# Patient Record
Sex: MALE | Race: WHITE | Employment: PART TIME | URBAN - METROPOLITAN AREA
[De-identification: names, ages, dates, MRNs, and addresses within clinical notes are randomized per-mention and may not be internally consistent; named-entity substitution may affect disease eponyms.]

---

## 2017-06-28 PROCEDURE — 86141 C-REACTIVE PROTEIN HS: CPT | Performed by: INTERNAL MEDICINE

## 2017-11-22 PROCEDURE — 87081 CULTURE SCREEN ONLY: CPT | Performed by: EMERGENCY MEDICINE

## 2018-04-19 PROCEDURE — 87205 SMEAR GRAM STAIN: CPT | Performed by: INTERNAL MEDICINE

## 2018-04-19 PROCEDURE — 87070 CULTURE OTHR SPECIMN AEROBIC: CPT | Performed by: INTERNAL MEDICINE

## 2018-04-19 PROCEDURE — 87186 SC STD MICRODIL/AGAR DIL: CPT | Performed by: INTERNAL MEDICINE

## 2018-04-19 PROCEDURE — 87077 CULTURE AEROBIC IDENTIFY: CPT | Performed by: INTERNAL MEDICINE

## 2020-05-14 ENCOUNTER — HOSPITAL ENCOUNTER (EMERGENCY)
Age: 50
Discharge: HOME | End: 2020-05-14
Attending: EMERGENCY MEDICINE
Payer: OTHER MISCELLANEOUS

## 2020-05-14 VITALS
HEART RATE: 74 BPM | BODY MASS INDEX: 45.1 KG/M2 | TEMPERATURE: 98 F | HEIGHT: 70 IN | RESPIRATION RATE: 16 BRPM | WEIGHT: 315 LBS | DIASTOLIC BLOOD PRESSURE: 74 MMHG | SYSTOLIC BLOOD PRESSURE: 138 MMHG | OXYGEN SATURATION: 98 %

## 2020-05-14 DIAGNOSIS — M79.5 FOREIGN BODY (FB) IN SOFT TISSUE: Primary | ICD-10-CM

## 2020-05-14 PROCEDURE — 90471 IMMUNIZATION ADMIN: CPT

## 2020-05-14 PROCEDURE — 10120 INC&RMVL FB SUBQ TISS SMPL: CPT

## 2020-05-14 PROCEDURE — 99284 EMERGENCY DEPT VISIT MOD MDM: CPT

## 2020-05-14 RX ORDER — CEPHALEXIN 250 MG/1
250 CAPSULE ORAL ONCE
Status: COMPLETED | OUTPATIENT
Start: 2020-05-14 | End: 2020-05-14

## 2020-05-14 RX ORDER — LIDOCAINE HYDROCHLORIDE AND EPINEPHRINE 10; 10 MG/ML; UG/ML
1 INJECTION, SOLUTION INFILTRATION; PERINEURAL ONCE
Status: DISCONTINUED | OUTPATIENT
Start: 2020-05-14 | End: 2020-05-14

## 2020-05-14 RX ORDER — LIDOCAINE HYDROCHLORIDE AND EPINEPHRINE 15; 5 MG/ML; UG/ML
1 INJECTION, SOLUTION EPIDURAL ONCE
Status: COMPLETED | OUTPATIENT
Start: 2020-05-14 | End: 2020-05-14

## 2020-05-14 RX ORDER — NAPROXEN 500 MG/1
500 TABLET ORAL 2 TIMES DAILY PRN
Qty: 20 TABLET | Refills: 0 | Status: SHIPPED | OUTPATIENT
Start: 2020-05-14 | End: 2020-05-21

## 2020-05-14 RX ORDER — CEPHALEXIN 500 MG/1
500 CAPSULE ORAL 4 TIMES DAILY
Qty: 40 CAPSULE | Refills: 0 | Status: SHIPPED | OUTPATIENT
Start: 2020-05-14 | End: 2020-05-24

## 2020-05-15 ENCOUNTER — APPOINTMENT (OUTPATIENT)
Dept: OTHER | Facility: HOSPITAL | Age: 50
End: 2020-05-15
Attending: FAMILY MEDICINE

## 2020-05-15 NOTE — ED PROVIDER NOTES
Patient Seen in: [facility] In [place]      History   Patient presents with:  FB in Skin    Stated Complaint: sliver in arm     HPI    44 old male who is right-hand dominant presents to the emerge department after he sustained a \"sli Appearance: Normal appearance. He is normal weight. HENT:      Head: Normocephalic and atraumatic. Cardiovascular:      Rate and Rhythm: Normal rate and regular rhythm. Pulses: Normal pulses. Heart sounds: Normal heart sounds.    Pulmonary: Schedule an appointment as soon as possible for a visit  As needed    [facility] in [facility] [address]  [phone]    As needed, For wound re-check    Johanne Alarcon [phone]

## 2021-04-03 ENCOUNTER — LAB ENCOUNTER (OUTPATIENT)
Dept: LAB | Facility: HOSPITAL | Age: 51
End: 2021-04-03
Attending: INTERNAL MEDICINE
Payer: COMMERCIAL

## 2021-04-03 DIAGNOSIS — Z12.11 SPECIAL SCREENING FOR MALIGNANT NEOPLASMS, COLON: ICD-10-CM

## 2021-04-05 ENCOUNTER — HOSPITAL ENCOUNTER (OUTPATIENT)
Facility: HOSPITAL | Age: 51
Setting detail: HOSPITAL OUTPATIENT SURGERY
Discharge: HOME | End: 2021-04-05
Attending: INTERNAL MEDICINE | Admitting: INTERNAL MEDICINE
Payer: COMMERCIAL

## 2021-04-05 ENCOUNTER — ANESTHESIA (OUTPATIENT)
Dept: ENDOSCOPY | Facility: HOSPITAL | Age: 51
End: 2021-04-05
Payer: COMMERCIAL

## 2021-04-05 ENCOUNTER — ANESTHESIA EVENT (OUTPATIENT)
Dept: ENDOSCOPY | Facility: HOSPITAL | Age: 51
End: 2021-04-05
Payer: COMMERCIAL

## 2021-04-05 VITALS
RESPIRATION RATE: 18 BRPM | TEMPERATURE: 98 F | DIASTOLIC BLOOD PRESSURE: 79 MMHG | HEART RATE: 73 BPM | WEIGHT: 315 LBS | OXYGEN SATURATION: 99 % | BODY MASS INDEX: 45.1 KG/M2 | HEIGHT: 70 IN | SYSTOLIC BLOOD PRESSURE: 154 MMHG

## 2021-04-05 DIAGNOSIS — Z01.810 PREOP CARDIOVASCULAR EXAM: ICD-10-CM

## 2021-04-05 DIAGNOSIS — G47.33 OSA (OBSTRUCTIVE SLEEP APNEA): ICD-10-CM

## 2021-04-05 DIAGNOSIS — E66.09 CLASS 2 OBESITY DUE TO EXCESS CALORIES WITHOUT SERIOUS COMORBIDITY IN ADULT, UNSPECIFIED BMI: ICD-10-CM

## 2021-04-05 DIAGNOSIS — Z12.11 SPECIAL SCREENING FOR MALIGNANT NEOPLASMS, COLON: Primary | ICD-10-CM

## 2021-04-05 PROCEDURE — 88305 TISSUE EXAM BY PATHOLOGIST: CPT | Performed by: INTERNAL MEDICINE

## 2021-04-05 PROCEDURE — 0DBL8ZX EXCISION OF TRANSVERSE COLON, VIA NATURAL OR ARTIFICIAL OPENING ENDOSCOPIC, DIAGNOSTIC: ICD-10-PCS | Performed by: INTERNAL MEDICINE

## 2021-04-05 PROCEDURE — 0DBK8ZX EXCISION OF ASCENDING COLON, VIA NATURAL OR ARTIFICIAL OPENING ENDOSCOPIC, DIAGNOSTIC: ICD-10-PCS | Performed by: INTERNAL MEDICINE

## 2021-04-05 RX ORDER — SODIUM CHLORIDE, SODIUM LACTATE, POTASSIUM CHLORIDE, CALCIUM CHLORIDE 600; 310; 30; 20 MG/100ML; MG/100ML; MG/100ML; MG/100ML
INJECTION, SOLUTION INTRAVENOUS CONTINUOUS
Status: DISCONTINUED | OUTPATIENT
Start: 2021-04-05 | End: 2021-04-05

## 2021-04-05 RX ORDER — LIDOCAINE HYDROCHLORIDE 10 MG/ML
INJECTION, SOLUTION EPIDURAL; INFILTRATION; INTRACAUDAL; PERINEURAL AS NEEDED
Status: DISCONTINUED | OUTPATIENT
Start: 2021-04-05 | End: 2021-04-05 | Stop reason: SURG

## 2021-04-05 RX ORDER — NALOXONE HYDROCHLORIDE 0.4 MG/ML
80 INJECTION, SOLUTION INTRAMUSCULAR; INTRAVENOUS; SUBCUTANEOUS AS NEEDED
Status: DISCONTINUED | OUTPATIENT
Start: 2021-04-05 | End: 2021-04-05

## 2021-04-05 RX ADMIN — LIDOCAINE HYDROCHLORIDE 50 MG: 10 INJECTION, SOLUTION EPIDURAL; INFILTRATION; INTRACAUDAL; PERINEURAL at 09:23:00

## 2021-04-05 RX ADMIN — SODIUM CHLORIDE, SODIUM LACTATE, POTASSIUM CHLORIDE, CALCIUM CHLORIDE: 600; 310; 30; 20 INJECTION, SOLUTION INTRAVENOUS at 09:28:00

## 2021-04-05 NOTE — OPERATIVE REPORT
ENDOSCOPY OPERATIVE REPORT    Patient Name:  Mitesh Mata  Medical Record #: [id number]  YOB: 1970  Date of Procedure: 4/5/2021    Preoperative Diagnosis:  screening    Postoperative Diagnosis: polyps    Procedure Performed: Colonosc signs were stable. Specimens:  See above      Condition on discharge from procedure:  good      PLAN:  Patient is to follow a high fiber, low fat diet and followup with primary physician for routine care.     I will communicate results of the patholo

## 2021-04-05 NOTE — PROGRESS NOTES
Lab test for covid 19 is negative     Natalee Steen MD  [facility]

## 2021-04-05 NOTE — ANESTHESIA PREPROCEDURE EVALUATION
PRE-OP EVALUATION    Patient Name: Tonya Bray    Admit Diagnosis: Preop cardiovascular exam [N94.216]  Special screening for malignant neoplasms, colon [Z12.11]  LESLY (obstructive sleep apnea) [G47.33]  Class 2 obesity due to excess calories wi OTHER SURGICAL HISTORY  '94    acl right knee   • WISDOM TEETH REMOVED       Social History    Tobacco Use      Smoking status: Never Smoker      Smokeless tobacco: Never Used    Alcohol use: Yes      Comment: rarely      Drug use: No     Available pre-op

## 2021-04-05 NOTE — BRIEF OP NOTE
Pre-Operative Diagnosis: Preop cardiovascular exam [Q98.467]  Special screening for malignant neoplasms, colon [Z12.11]  LESLY (obstructive sleep apnea) [G47.33]  Class 2 obesity due to excess calories without serious comorbidity in adult, unspecified BMI [E

## 2021-04-05 NOTE — ANESTHESIA POSTPROCEDURE EVALUATION
[address] Patient Status:  Hospital Outpatient Surgery   Age/Gender 44year old male MRN [id number]   Location [id number] [place] [address] Attending Iliana Verma MD   Hosp Day # 0 PCP Kofi Martinez MD

## 2021-04-05 NOTE — H&P
I have examined this patient and there are no changes to h/p 3/12/21.     States prep is clear    We rediscussed the risks, benefits, alternatives and limitations to the procedure and sedation, all the patients questions were answered, the patient demonstra

## 2021-04-06 NOTE — PROGRESS NOTES
Here are the  biopsy/pathology findings from your recent Colonoscopy :  --you had \"adenoma\" type of polyps removed.   These type of polyps are a a type of precancerous polyp          Follow-up information: I'm sorry I missed you by phone, I have the follo

## 2021-04-12 DIAGNOSIS — Z23 NEED FOR VACCINATION: ICD-10-CM

## 2022-02-12 ENCOUNTER — HOSPITAL ENCOUNTER (EMERGENCY)
Age: 52
Discharge: HOME | End: 2022-02-12
Payer: COMMERCIAL

## 2022-02-12 ENCOUNTER — APPOINTMENT (OUTPATIENT)
Dept: GENERAL RADIOLOGY | Age: 52
End: 2022-02-12
Payer: COMMERCIAL

## 2022-02-12 ENCOUNTER — APPOINTMENT (OUTPATIENT)
Dept: ULTRASOUND IMAGING | Age: 52
End: 2022-02-12
Payer: COMMERCIAL

## 2022-02-12 VITALS
HEART RATE: 78 BPM | TEMPERATURE: 98 F | BODY MASS INDEX: 45.1 KG/M2 | DIASTOLIC BLOOD PRESSURE: 69 MMHG | SYSTOLIC BLOOD PRESSURE: 152 MMHG | WEIGHT: 315 LBS | RESPIRATION RATE: 16 BRPM | OXYGEN SATURATION: 97 % | HEIGHT: 70 IN

## 2022-02-12 DIAGNOSIS — M25.562 CHRONIC PAIN OF LEFT KNEE: ICD-10-CM

## 2022-02-12 DIAGNOSIS — M25.462 KNEE EFFUSION, LEFT: Primary | ICD-10-CM

## 2022-02-12 DIAGNOSIS — G89.29 CHRONIC PAIN OF LEFT KNEE: ICD-10-CM

## 2022-02-12 PROCEDURE — 99284 EMERGENCY DEPT VISIT MOD MDM: CPT

## 2022-02-12 PROCEDURE — 93971 EXTREMITY STUDY: CPT

## 2022-02-12 PROCEDURE — 73562 X-RAY EXAM OF KNEE 3: CPT

## 2023-08-09 ENCOUNTER — HOSPITAL ENCOUNTER (OUTPATIENT)
Age: 53
Discharge: HOME | End: 2023-08-09
Payer: COMMERCIAL

## 2023-08-09 VITALS
WEIGHT: 315 LBS | SYSTOLIC BLOOD PRESSURE: 156 MMHG | RESPIRATION RATE: 14 BRPM | TEMPERATURE: 97 F | OXYGEN SATURATION: 98 % | BODY MASS INDEX: 45.1 KG/M2 | HEIGHT: 70 IN | DIASTOLIC BLOOD PRESSURE: 84 MMHG | HEART RATE: 70 BPM

## 2023-08-09 DIAGNOSIS — U07.1 COVID: ICD-10-CM

## 2023-08-09 DIAGNOSIS — R51.9 HEADACHE: Primary | ICD-10-CM

## 2023-08-09 LAB — SARS-COV-2 RNA RESP QL NAA+PROBE: DETECTED

## 2023-08-09 PROCEDURE — U0002 COVID-19 LAB TEST NON-CDC: HCPCS | Performed by: PHYSICIAN ASSISTANT

## 2023-08-09 PROCEDURE — 99203 OFFICE O/P NEW LOW 30 MIN: CPT | Performed by: PHYSICIAN ASSISTANT

## 2023-08-09 RX ORDER — METFORMIN HYDROCHLORIDE 500 MG/1
1 TABLET, EXTENDED RELEASE ORAL
COMMUNITY
Start: 2022-12-09

## 2023-08-09 NOTE — ED INITIAL ASSESSMENT (HPI)
Pt sts 3 days ago began with sore throat, nasal congestion, cough, fever, HA. Tested positive for covid yesterday. Here for covid test for work.

## 2025-01-13 ENCOUNTER — APPOINTMENT (OUTPATIENT)
Dept: GENERAL RADIOLOGY | Age: 55
End: 2025-01-13
Attending: PHYSICIAN ASSISTANT
Payer: COMMERCIAL

## 2025-01-13 ENCOUNTER — HOSPITAL ENCOUNTER (OUTPATIENT)
Age: 55
Discharge: HOME | End: 2025-01-13
Payer: COMMERCIAL

## 2025-01-13 VITALS
HEART RATE: 88 BPM | DIASTOLIC BLOOD PRESSURE: 83 MMHG | RESPIRATION RATE: 20 BRPM | SYSTOLIC BLOOD PRESSURE: 157 MMHG | OXYGEN SATURATION: 96 % | TEMPERATURE: 99 F

## 2025-01-13 DIAGNOSIS — R05.2 SUBACUTE COUGH: ICD-10-CM

## 2025-01-13 DIAGNOSIS — J20.8 VIRAL BRONCHITIS: Primary | ICD-10-CM

## 2025-01-13 PROCEDURE — 99214 OFFICE O/P EST MOD 30 MIN: CPT | Performed by: PHYSICIAN ASSISTANT

## 2025-01-13 PROCEDURE — 71046 X-RAY EXAM CHEST 2 VIEWS: CPT | Performed by: PHYSICIAN ASSISTANT

## 2025-01-13 RX ORDER — PREDNISONE 20 MG/1
40 TABLET ORAL DAILY
Qty: 10 TABLET | Refills: 0 | Status: SHIPPED | OUTPATIENT
Start: 2025-01-13 | End: 2025-01-18

## 2025-01-13 RX ORDER — BENZONATATE 200 MG/1
200 CAPSULE ORAL 3 TIMES DAILY PRN
Qty: 20 CAPSULE | Refills: 0 | Status: SHIPPED | OUTPATIENT
Start: 2025-01-13

## 2025-01-13 RX ORDER — AMLODIPINE BESYLATE 5 MG/1
5 TABLET ORAL AS DIRECTED
COMMUNITY
Start: 2024-03-08 | End: 2025-03-03

## 2025-01-13 NOTE — DISCHARGE INSTRUCTIONS
Finish your antibiotic  Can start over-the-counter Zyrtec daily  Take prednisone as directed until gone  May use Tessalon Perles as needed for cough  Close follow-up with your primary care doctor  Return to the ER symptoms worsen

## 2025-01-13 NOTE — ED INITIAL ASSESSMENT (HPI)
Pt with cough since before neal. Settling in chest. Was seen at [facility] for augmentin. Has a couple doses left but not getting better.

## 2025-01-13 NOTE — ED PROVIDER NOTES
Patient Seen in: [facility]      History     Chief Complaint   Patient presents with    Cough/URI     Stated Complaint: cough    Subjective:   The history is provided by the patient.         54-year-old male with hypertension presents to immediate care due to cough for the past 3 weeks.  Initially started with URI type symptoms of nasal congestion sore throat and a dry cough.  Cough is becoming more productive and endorsing congestion in his chest.  No chest pain or shortness of breath.  Patient was evaluated in outside clinic and diagnosed as a sinobronchitis and started on Augmentin.  Patient's been taking the medication for the last few days however continues to feel the congestion.  Patient has been taking multiple over-the-counter cough medications without relief.  No history of asthma smoking or COPD    Objective:     Past Medical History:    Essential hypertension    HYPERLIPIDEMIA    Hyperlipidemia    Hyperlipidemia LDL goal < 130    OBESITY    SLEEP APNEA    / / SaO2 78%/ CPAP 18/               Past Surgical History:   Procedure Laterality Date    Colonoscopy N/A 4/5/2021    Procedure: COLONOSCOPY with cold snare polypectomy and forceps polypectomy;  Surgeon: Nkaul Mcgill MD;  Location:  ENDOSCOPY    Other surgical history  '94    acl right knee    [place] teeth removed                  Social History     Socioeconomic History    Marital status:    Tobacco Use    Smoking status: Never    Smokeless tobacco: Never   Vaping Use    Vaping status: Never Used   Substance and Sexual Activity    Alcohol use: Yes     Comment: rarely    Drug use: No     Social Drivers of Health      [facility]    Housing Stability              Review of Systems   Constitutional:  Negative for chills, fatigue and fever.   HENT:  Positive for congestion. Negative for sore throat, trouble swallowing and voice change.     Respiratory:  Positive for cough. Negative for shortness of breath, wheezing and stridor.    Cardiovascular: Negative.    Gastrointestinal: Negative.    Neurological: Negative.        Positive for stated complaint: cough  Other systems are as noted in HPI.  Constitutional and vital signs reviewed.      All other systems reviewed and negative except as noted above.    Physical Exam     ED Triage Vitals [01/13/25 1117]   /83   Pulse 88   Resp 20   Temp 98.5 °F (36.9 °C)   Temp src Oral   SpO2 96 %   O2 Device None (Room air)       Current Vitals:   Vital Signs  BP: 157/83  Pulse: 88  Resp: 20  Temp: 98.5 °F (36.9 °C)  Temp src: Oral    Oxygen Therapy  SpO2: 96 %  O2 Device: None (Room air)        Physical Exam  Vitals and nursing note reviewed.   Constitutional:       General: He is not in acute distress.     Appearance: Normal appearance.   HENT:      Right Ear: Tympanic membrane, ear canal and external ear normal.      Left Ear: Tympanic membrane, ear canal and external ear normal.      Nose: Congestion present.      Mouth/Throat:      Mouth: Mucous membranes are moist.      Pharynx: No oropharyngeal exudate or posterior oropharyngeal erythema.   Eyes:      Extraocular Movements: Extraocular movements intact.      Conjunctiva/sclera: Conjunctivae normal.      Pupils: Pupils are equal, round, and reactive to light.   Cardiovascular:      Rate and Rhythm: Normal rate and regular rhythm.   Pulmonary:      Effort: Pulmonary effort is normal. No respiratory distress.      Breath sounds: Normal breath sounds.   Musculoskeletal:         General: Normal range of motion.      Cervical back: Normal range of motion.   Lymphadenopathy:      Cervical: No cervical adenopathy.   Skin:     General: Skin is warm.   Neurological:      General: No focal deficit present.      Mental Status: He is alert and oriented to person, place, and time.   Psychiatric:         Mood and Affect: Mood normal.         Behavior: Behavior  normal.             ED Course   Labs Reviewed - No data to display         XR CHEST PA + LAT CHEST (CPT=71046)    Result Date: 1/13/2025  PROCEDURE:  XR CHEST PA + LAT CHEST (CPT=71046)  INDICATIONS:  cough  COMPARISON:  None.  TECHNIQUE:  PA and lateral chest radiographs were obtained.  PATIENT STATED HISTORY: (As transcribed by Technologist)  The patient has a cough for about three weeks.    FINDINGS:  Normal heart size and pulmonary vascularity. No pleural effusion or pneumothorax. No lobar consolidation.  Degenerative changes in the spine.            CONCLUSION:  No active cardiopulmonary process identified.   LOCATION:  [facility]   Dictated by (CST): Aaron Rubio MD on 1/13/2025 at 11:43 AM     Finalized by (CST): Aaron Rubio MD on 1/13/2025 at 11:43 AM             MDM   Ddx -viral URI with cough, COVID, bronchitis, CAP    The patient is afebrile nontoxic.  No respiratory distress.  Vital signs are stable.  Nasal congestion present.  Posterior pharynx shows mild erythema no exudate.  Lungs clear to auscultation...  Due to the subacute nature of the cough chest x-ray was performed, review of the x-ray shows no acute findings..  Exam is consistent with viral bronchitis.  Will have him finish Augmentin, prescribed prednisone and Tessalon Perles. Discussed at length with the patient at home care strict return precautions.    All questions were answered and the patient is comfortable to plan discharge home          Medical Decision Making  Problems Addressed:  Subacute cough: acute illness or injury  Viral bronchitis: acute illness or injury    Amount and/or Complexity of Data Reviewed  Radiology: ordered and independent interpretation performed. Decision-making details documented in ED Course.    Risk  OTC drugs.  Prescription drug management.        Disposition and Plan     Clinical Impression:  1. Viral bronchitis    2. Subacute cough         Disposition:  Discharge  1/13/2025 11:54 am    Follow-up:  Darvin  Rossana ARORA MD  [address] [place] [address]  [phone]                Medications Prescribed:  Current Discharge Medication List        START taking these medications    Details   predniSONE 20 MG Oral Tab Take 2 tablets (40 mg total) by mouth daily for 5 days.  Qty: 10 tablet, Refills: 0      benzonatate 200 MG Oral Cap Take 1 capsule (200 mg total) by mouth 3 (three) times daily as needed.  Qty: 20 capsule, Refills: 0                 Supplementary Documentation:

## (undated) DEVICE — SNARE CAPTIFLEX MICRO-OVL OLY

## (undated) DEVICE — ENDOSCOPY PACK - LOWER: Brand: MEDLINE INDUSTRIES, INC.

## (undated) DEVICE — FILTERLINE NASAL ADULT O2/CO2

## (undated) DEVICE — Device: Brand: DEFENDO AIR/WATER/SUCTION AND BIOPSY VALVE

## (undated) DEVICE — 1200CC GUARDIAN II: Brand: GUARDIAN

## (undated) DEVICE — TRAP 4 CPTR CHMBR N EZ INLN

## (undated) DEVICE — ENDOCUFF ADULT GREEN

## (undated) DEVICE — 3M™ RED DOT™ MONITORING ELECTRODE WITH FOAM TAPE AND STICKY GEL, 50/BAG, 20/CASE, 72/PLT 2570: Brand: RED DOT™

## (undated) DEVICE — FORCEP BIOPSY RJ4 LG CAP W/ND

## (undated) NOTE — LETTER
Date & Time: 2/12/2022, 12:11 PM  Patient: Mercedes Dyer  Encounter Provider(s):    See, Solis Graves PA-C       To Whom It May Concern:    Mina Brice was seen and treated in our department on 2/12/2022. He should not return to work until 2/15/2022.     If you have any questions or concerns, please do not hesitate to call.        _____________________________  Physician/APC Signature

## (undated) NOTE — LETTER
Date & Time: 8/9/2023, 9:45 AM  Patient: Valentina Stahl  Encounter Provider(s):    ACE Chung       To Whom It May Concern:    Kristina Babb was seen and treated in our department on 8/9/2023. He should not return to work until 08/14/2023 .     If you have any questions or concerns, please do not hesitate to call.        _____________________________  Physician/APC Signature

## (undated) NOTE — LETTER
4/7/2021          [address] [address]    Dear Crys Brand,       Here are the  biopsy/pathology findings from your recent Colonoscopy :  --you had \"adenoma\" type of polyps removed.   These type of polyps are a